# Patient Record
Sex: FEMALE | Race: WHITE | NOT HISPANIC OR LATINO | URBAN - METROPOLITAN AREA
[De-identification: names, ages, dates, MRNs, and addresses within clinical notes are randomized per-mention and may not be internally consistent; named-entity substitution may affect disease eponyms.]

---

## 2023-04-06 ENCOUNTER — HOSPITAL ENCOUNTER (OUTPATIENT)
Dept: DATA CONVERSION | Facility: HOSPITAL | Age: 18
End: 2023-04-06
Attending: INTERNAL MEDICINE | Admitting: INTERNAL MEDICINE
Payer: COMMERCIAL

## 2023-04-06 DIAGNOSIS — R19.7 DIARRHEA, UNSPECIFIED: ICD-10-CM

## 2023-04-06 DIAGNOSIS — G43.909 MIGRAINE, UNSPECIFIED, NOT INTRACTABLE, WITHOUT STATUS MIGRAINOSUS: ICD-10-CM

## 2023-04-06 DIAGNOSIS — R10.12 LEFT UPPER QUADRANT PAIN: ICD-10-CM

## 2023-04-06 DIAGNOSIS — R11.2 NAUSEA WITH VOMITING, UNSPECIFIED: ICD-10-CM

## 2023-04-06 DIAGNOSIS — R11.0 NAUSEA: ICD-10-CM

## 2023-04-06 DIAGNOSIS — F41.9 ANXIETY DISORDER, UNSPECIFIED: ICD-10-CM

## 2023-04-06 DIAGNOSIS — F32.A DEPRESSION, UNSPECIFIED: ICD-10-CM

## 2023-04-06 DIAGNOSIS — K29.50 UNSPECIFIED CHRONIC GASTRITIS WITHOUT BLEEDING: ICD-10-CM

## 2023-04-06 DIAGNOSIS — R10.9 UNSPECIFIED ABDOMINAL PAIN: ICD-10-CM

## 2023-04-06 DIAGNOSIS — Z88.0 ALLERGY STATUS TO PENICILLIN: ICD-10-CM

## 2023-04-10 LAB
COMPLETE PATHOLOGY REPORT: NORMAL
CONVERTED CLINICAL DIAGNOSIS-HISTORY: NORMAL
CONVERTED FINAL DIAGNOSIS: NORMAL
CONVERTED FINAL REPORT PDF LINK TO COPY AND PASTE: NORMAL
CONVERTED GROSS DESCRIPTION: NORMAL

## 2023-09-06 VITALS — BODY MASS INDEX: 24.21 KG/M2 | WEIGHT: 145.28 LBS | HEIGHT: 65 IN

## 2023-09-07 LAB
ALANINE AMINOTRANSFERASE (SGPT) (U/L) IN SER/PLAS: 10 U/L (ref 7–45)
ALBUMIN (G/DL) IN SER/PLAS: 4.3 G/DL (ref 3.4–5)
ALKALINE PHOSPHATASE (U/L) IN SER/PLAS: 50 U/L (ref 33–110)
ANION GAP IN SER/PLAS: 9 MMOL/L (ref 10–20)
ASPARTATE AMINOTRANSFERASE (SGOT) (U/L) IN SER/PLAS: 17 U/L (ref 9–39)
BILIRUBIN TOTAL (MG/DL) IN SER/PLAS: 0.7 MG/DL (ref 0–1.2)
CALCIUM (MG/DL) IN SER/PLAS: 9.5 MG/DL (ref 8.6–10.3)
CARBON DIOXIDE, TOTAL (MMOL/L) IN SER/PLAS: 28 MMOL/L (ref 21–32)
CHLORIDE (MMOL/L) IN SER/PLAS: 105 MMOL/L (ref 98–107)
COBALAMIN (VITAMIN B12) (PG/ML) IN SER/PLAS: 422 PG/ML (ref 211–911)
CREATININE (MG/DL) IN SER/PLAS: 0.9 MG/DL (ref 0.5–1.05)
D-DIMER, QUANTITATIVE VTE EXCLUSION: 369 NG/ML FEU
ERYTHROCYTE DISTRIBUTION WIDTH (RATIO) BY AUTOMATED COUNT: 12.4 % (ref 11.5–14.5)
ERYTHROCYTE MEAN CORPUSCULAR HEMOGLOBIN CONCENTRATION (G/DL) BY AUTOMATED: 32.3 G/DL (ref 32–36)
ERYTHROCYTE MEAN CORPUSCULAR VOLUME (FL) BY AUTOMATED COUNT: 91 FL (ref 80–100)
ERYTHROCYTES (10*6/UL) IN BLOOD BY AUTOMATED COUNT: 4.37 X10E12/L (ref 4–5.2)
FERRITIN (UG/LL) IN SER/PLAS: 15 UG/L (ref 8–150)
FOLATE (NG/ML) IN SER/PLAS: 9 NG/ML
GFR FEMALE: >90 ML/MIN/1.73M2
GLUCOSE (MG/DL) IN SER/PLAS: 68 MG/DL (ref 74–99)
HEMATOCRIT (%) IN BLOOD BY AUTOMATED COUNT: 39.9 % (ref 36–46)
HEMOGLOBIN (G/DL) IN BLOOD: 12.9 G/DL (ref 12–16)
IRON (UG/DL) IN SER/PLAS: 300 UG/DL (ref 35–150)
IRON BINDING CAPACITY (UG/DL) IN SER/PLAS: 397 UG/DL (ref 240–445)
IRON SATURATION (%) IN SER/PLAS: 76 % (ref 25–45)
LEUKOCYTES (10*3/UL) IN BLOOD BY AUTOMATED COUNT: 6 X10E9/L (ref 4.4–11.3)
MAGNESIUM (MG/DL) IN SER/PLAS: 1.92 MG/DL (ref 1.6–2.4)
NATRIURETIC PEPTIDE B (PG/ML) IN SER/PLAS: 6 PG/ML (ref 0–99)
NRBC (PER 100 WBCS) BY AUTOMATED COUNT: 0 /100 WBC (ref 0–0)
PLATELETS (10*3/UL) IN BLOOD AUTOMATED COUNT: 294 X10E9/L (ref 150–450)
POTASSIUM (MMOL/L) IN SER/PLAS: 3.8 MMOL/L (ref 3.5–5.3)
PROTEIN TOTAL: 6.8 G/DL (ref 6.4–8.2)
SODIUM (MMOL/L) IN SER/PLAS: 138 MMOL/L (ref 136–145)
THYROTROPIN (MIU/L) IN SER/PLAS BY DETECTION LIMIT <= 0.05 MIU/L: 1.62 MIU/L (ref 0.44–3.98)
UREA NITROGEN (MG/DL) IN SER/PLAS: 9 MG/DL (ref 6–23)

## 2023-09-29 PROBLEM — R42 DIZZINESS: Status: ACTIVE | Noted: 2023-09-29

## 2023-09-29 PROBLEM — E83.119 HEMOCHROMATOSIS: Status: ACTIVE | Noted: 2023-09-29

## 2023-09-29 PROBLEM — F32.A DEPRESSION: Status: ACTIVE | Noted: 2023-09-29

## 2023-09-29 PROBLEM — F41.9 ANXIETY: Status: ACTIVE | Noted: 2023-09-29

## 2023-09-29 PROBLEM — G43.909 MIGRAINE: Status: ACTIVE | Noted: 2023-09-29

## 2023-09-29 PROBLEM — K92.1 BLOOD IN STOOL: Status: ACTIVE | Noted: 2023-09-29

## 2023-09-29 PROBLEM — R00.0 TACHYCARDIA: Status: ACTIVE | Noted: 2023-09-29

## 2023-09-29 PROBLEM — S06.0X0A CONCUSSION WITH NO LOSS OF CONSCIOUSNESS: Status: ACTIVE | Noted: 2023-09-29

## 2023-09-29 PROBLEM — R06.09 DYSPNEA ON EXERTION: Status: ACTIVE | Noted: 2023-09-29

## 2023-09-29 PROBLEM — R10.9 ABDOMINAL PAIN: Status: ACTIVE | Noted: 2023-09-29

## 2023-09-29 PROBLEM — R00.2 HEART PALPITATIONS: Status: ACTIVE | Noted: 2023-09-29

## 2023-09-29 RX ORDER — METOCLOPRAMIDE 5 MG/1
5 TABLET ORAL 4 TIMES DAILY
COMMUNITY
Start: 2023-03-17 | End: 2023-11-06 | Stop reason: WASHOUT

## 2023-09-29 RX ORDER — VENLAFAXINE HYDROCHLORIDE 150 MG/1
150 CAPSULE, EXTENDED RELEASE ORAL DAILY
COMMUNITY

## 2023-09-29 RX ORDER — LANOLIN ALCOHOL/MO/W.PET/CERES
1 CREAM (GRAM) TOPICAL DAILY
COMMUNITY
End: 2023-11-06 | Stop reason: WASHOUT

## 2023-09-29 RX ORDER — FAMOTIDINE 20 MG/1
1 TABLET, FILM COATED ORAL 2 TIMES DAILY
COMMUNITY
Start: 2023-03-17 | End: 2023-11-06 | Stop reason: ALTCHOICE

## 2023-09-29 RX ORDER — POLYETHYLENE GLYCOL 3350, SODIUM SULFATE ANHYDROUS, SODIUM BICARBONATE, SODIUM CHLORIDE, POTASSIUM CHLORIDE 236; 22.74; 6.74; 5.86; 2.97 G/4L; G/4L; G/4L; G/4L; G/4L
POWDER, FOR SOLUTION ORAL
COMMUNITY
Start: 2022-12-01 | End: 2023-11-30

## 2023-09-29 RX ORDER — SUCRALFATE 1 G/1
1 TABLET ORAL DAILY
COMMUNITY

## 2023-09-29 RX ORDER — VENLAFAXINE HYDROCHLORIDE 37.5 MG/1
CAPSULE, EXTENDED RELEASE ORAL
COMMUNITY

## 2023-10-02 ENCOUNTER — HOSPITAL ENCOUNTER (OUTPATIENT)
Dept: CARDIOLOGY | Facility: CLINIC | Age: 18
Discharge: HOME | End: 2023-10-02
Payer: COMMERCIAL

## 2023-10-02 DIAGNOSIS — R00.0 TACHYCARDIA: ICD-10-CM

## 2023-10-02 LAB — EJECTION FRACTION APICAL 4 CHAMBER: 48

## 2023-10-02 PROCEDURE — 93306 TTE W/DOPPLER COMPLETE: CPT

## 2023-10-02 PROCEDURE — 93306 TTE W/DOPPLER COMPLETE: CPT | Performed by: NURSE PRACTITIONER

## 2023-10-11 ENCOUNTER — HOSPITAL ENCOUNTER (OUTPATIENT)
Dept: CARDIOLOGY | Facility: HOSPITAL | Age: 18
Discharge: HOME | End: 2023-10-11
Payer: COMMERCIAL

## 2023-10-11 DIAGNOSIS — R00.0 TACHYCARDIA: ICD-10-CM

## 2023-10-11 PROCEDURE — 93246 EXT ECG>7D<15D RECORDING: CPT

## 2023-10-30 ASSESSMENT — PATIENT HEALTH QUESTIONNAIRE - PHQ9
9. THOUGHTS THAT YOU WOULD BE BETTER OFF DEAD, OR OF HURTING YOURSELF: 0
1. LITTLE INTEREST OR PLEASURE IN DOING THINGS: SEVERAL DAYS
4. FEELING TIRED OR HAVING LITTLE ENERGY: NEARLY EVERY DAY
SUM OF ALL RESPONSES TO PHQ QUESTIONS 1-9: 10
8. MOVING OR SPEAKING SO SLOWLY THAT OTHER PEOPLE COULD HAVE NOTICED. OR THE OPPOSITE, BEING SO FIGETY OR RESTLESS THAT YOU HAVE BEEN MOVING AROUND A LOT MORE THAN USUAL: 0
8. MOVING OR SPEAKING SO SLOWLY THAT OTHER PEOPLE COULD HAVE NOTICED. OR THE OPPOSITE, BEING SO FIGETY OR RESTLESS THAT YOU HAVE BEEN MOVING AROUND A LOT MORE THAN USUAL: NOT AT ALL
7. TROUBLE CONCENTRATING ON THINGS, SUCH AS READING THE NEWSPAPER OR WATCHING TELEVISION: MORE THAN HALF THE DAYS
5. POOR APPETITE OR OVEREATING: SEVERAL DAYS
9. THOUGHTS THAT YOU WOULD BE BETTER OFF DEAD, OR OF HURTING YOURSELF: NOT AT ALL
1. LITTLE INTEREST OR PLEASURE IN DOING THINGS: SEVERAL DAYS
2. FEELING DOWN, DEPRESSED, IRRITABLE, OR HOPELESS: SEVERAL DAYS
9. THOUGHTS THAT YOU WOULD BE BETTER OFF DEAD, OR OF HURTING YOURSELF: NOT AT ALL
10. IF YOU CHECKED OFF ANY PROBLEMS, HOW DIFFICULT HAVE THESE PROBLEMS MADE IT FOR YOU TO DO YOUR WORK, TAKE CARE OF THINGS AT HOME, OR GET ALONG WITH OTHER PEOPLE: SOMEWHAT DIFFICULT
2. FEELING DOWN, DEPRESSED, IRRITABLE, OR HOPELESS: 1
7. TROUBLE CONCENTRATING ON THINGS, SUCH AS READING THE NEWSPAPER OR WATCHING TELEVISION: 2
10. IF YOU CHECKED OFF ANY PROBLEMS, HOW DIFFICULT HAVE THESE PROBLEMS MADE IT FOR YOU TO DO YOUR WORK, TAKE CARE OF THINGS AT HOME, OR GET ALONG WITH OTHER PEOPLE: SOMEWHAT DIFFICULT
8. MOVING OR SPEAKING SO SLOWLY THAT OTHER PEOPLE COULD HAVE NOTICED. OR THE OPPOSITE, BEING SO FIGETY OR RESTLESS THAT YOU HAVE BEEN MOVING AROUND A LOT MORE THAN USUAL: NOT AT ALL
3. TROUBLE FALLING OR STAYING ASLEEP OR SLEEPING TOO MUCH: SEVERAL DAYS
6. FEELING BAD ABOUT YOURSELF - OR THAT YOU ARE A FAILURE OR HAVE LET YOURSELF OR YOUR FAMILY DOWN: SEVERAL DAYS
4. FEELING TIRED OR HAVING LITTLE ENERGY: NEARLY EVERY DAY
7. TROUBLE CONCENTRATING ON THINGS, SUCH AS READING THE NEWSPAPER OR WATCHING TELEVISION: MORE THAN HALF THE DAYS
5. POOR APPETITE OR OVEREATING: 1
1. LITTLE INTEREST OR PLEASURE IN DOING THINGS: 1
6. FEELING BAD ABOUT YOURSELF - OR THAT YOU ARE A FAILURE OR HAVE LET YOURSELF OR YOUR FAMILY DOWN: SEVERAL DAYS
2. FEELING DOWN, DEPRESSED OR HOPELESS: SEVERAL DAYS
6. FEELING BAD ABOUT YOURSELF - OR THAT YOU ARE A FAILURE OR HAVE LET YOURSELF OR YOUR FAMILY DOWN: 1
3. TROUBLE FALLING OR STAYING ASLEEP OR SLEEPING TOO MUCH: 1
5. POOR APPETITE OR OVEREATING: SEVERAL DAYS
SUM OF ALL RESPONSES TO PHQ QUESTIONS 1-9: 10
3. TROUBLE FALLING OR STAYING ASLEEP OR SLEEPING TOO MUCH: SEVERAL DAYS
4. FEELING TIRED OR HAVING LITTLE ENERGY: 3

## 2023-11-03 NOTE — PROGRESS NOTES
Patient ID: Marcella Ng is a 18 y.o. female.  Referring Physician: No referring provider defined for this encounter.  Primary Care Provider: No primary care provider on file.      Subjective    HPI  Ms. Marcella Ng is a 19 y/o F presenting for initial consultation at the request of Tracy Schwab, NP, for elevated iron.     Patient had an iron saturation of 76% on 9/7/2023 with a ferritin of 15. Normal hemoglobin and hematocrit. No other lab abnormalities. TSH, folate and B12 also unremarkable. Of note, she did have an ABD on 4/6/2023 and had a biopsy of the small intestines and stomach which were unremarkable. She does have a concern for endometriosis.     Patient is not currently on iron and follows a gluten-free diet. She reports heavy periods with endometriosis along with intermittent palpitations. Not on birth control. No recurrent infections, fevers, night sweats or unexplained weight loss. Denies any lymphedema or lower extremity edema. No other complaints today.    Patient's past medical history, surgical history, family history and social history reviewed. Currently a teagan in college. Involved in track.     Objective   Vitals:    11/06/23 1258   BP: 118/85   Pulse: 96   Resp: 16   Temp: 36.4 °C (97.5 °F)   SpO2: 98%      Review of Systems:   Review of Systems:    Positive per HPI, otherwise negative.     Physical Exam:   Constitutional: Patient appears in no acute distress.   Sitting comfortably in chair.  Eyes: EOMI, clear sclera  ENMT: mucous membranes moist, no apparent injury  Head/Neck: Neck supple, no JVD  Respiratory/Thorax: Patent airways, CTAB, normal  breath sounds, no increased work of breathing  Cardiovascular: Regular, rate and rhythm, no murmurs  Gastrointestinal: Nondistended, soft, non-tender,  no rebound tenderness or guarding, no masses palpable  Extremities: normal extremities, no cyanosis edema,  no swelling  Neurological: alert and oriented x3, nonfocal, normal  speech and  hearing  Lymphatic: No palpable lymphadenopathy in cervical,  axillary  lymph nodes.  Spleen appears normal size.  Psychological: Appropriate mood and behavior, normal  affect  Skin: Warm and dry, no lesions, no rashes     Performance Status:  Symptomatic; fully ambulatory    Labs/Imaging/Pathology: personally reviewed reports and images in Epic electronic medical record system. Pertinent results as it related to the plan represented in below in assessment and plan.      Assessment/Plan    1. Elevated iron saturation     - Patient had a one-time elevation of iron saturation of 76%. Ferritin at that time was 15.   - Of note, she has heavy menstruation with endometriosis.  - We did discuss that iron saturation can be transient, however with her recent diagnosis of unexplained mitral valve regurgitation and concern for a cardiac history we will do workup for hemachromatosis.  - We will check a hemachromatosis panel along with a repeat CBC, CMP, iron and ferritin. We will call her with the results.  - We will plan for a phone visit in 1 week.  - RTC for a phone visit in 1 week.    RTC for a phone visit in 1 week. This note has been transcribed using a medical scribe and there is a possibility of unintentional typing misprints.     Diagnoses and all orders for this visit:  Iron overload, transfusional  -     Hemochromatosis Mutation; Future  -     CBC and Auto Differential; Future  -     Iron and TIBC; Future  -     Ferritin; Future    Monae Mcfarland MD  Hematology/Oncology  [facility]    Scribe Attestation  By signing my name below, Marilyn MANCERA Scribe attest that this documentation has been prepared under the direction and in the presence of Monae Mcfarland MD.

## 2023-11-06 ENCOUNTER — OFFICE VISIT (OUTPATIENT)
Dept: HEMATOLOGY/ONCOLOGY | Facility: CLINIC | Age: 18
End: 2023-11-06
Payer: COMMERCIAL

## 2023-11-06 ENCOUNTER — LAB (OUTPATIENT)
Dept: LAB | Facility: CLINIC | Age: 18
End: 2023-11-06
Payer: COMMERCIAL

## 2023-11-06 VITALS
DIASTOLIC BLOOD PRESSURE: 85 MMHG | TEMPERATURE: 97.5 F | WEIGHT: 143.74 LBS | HEIGHT: 67 IN | BODY MASS INDEX: 22.56 KG/M2 | SYSTOLIC BLOOD PRESSURE: 118 MMHG | OXYGEN SATURATION: 98 % | HEART RATE: 96 BPM | RESPIRATION RATE: 16 BRPM

## 2023-11-06 DIAGNOSIS — E83.111 IRON OVERLOAD, TRANSFUSIONAL: Primary | ICD-10-CM

## 2023-11-06 DIAGNOSIS — E83.111 IRON OVERLOAD, TRANSFUSIONAL: ICD-10-CM

## 2023-11-06 LAB
BASOPHILS # BLD AUTO: 0.02 X10*3/UL (ref 0–0.1)
BASOPHILS NFR BLD AUTO: 0.3 %
EOSINOPHIL # BLD AUTO: 0.04 X10*3/UL (ref 0–0.7)
EOSINOPHIL NFR BLD AUTO: 0.6 %
ERYTHROCYTE [DISTWIDTH] IN BLOOD BY AUTOMATED COUNT: 12.9 % (ref 11.5–14.5)
FERRITIN SERPL-MCNC: 12 NG/ML (ref 8–150)
HCT VFR BLD AUTO: 40.1 % (ref 36–46)
HGB BLD-MCNC: 13.1 G/DL (ref 12–16)
IMM GRANULOCYTES # BLD AUTO: 0 X10*3/UL (ref 0–0.7)
IMM GRANULOCYTES NFR BLD AUTO: 0 % (ref 0–0.9)
IRON SATN MFR SERPL: 13 % (ref 25–45)
IRON SERPL-MCNC: 52 UG/DL (ref 35–150)
LYMPHOCYTES # BLD AUTO: 2.12 X10*3/UL (ref 1.2–4.8)
LYMPHOCYTES NFR BLD AUTO: 29.2 %
MCH RBC QN AUTO: 30 PG (ref 26–34)
MCHC RBC AUTO-ENTMCNC: 32.7 G/DL (ref 32–36)
MCV RBC AUTO: 92 FL (ref 80–100)
MONOCYTES # BLD AUTO: 0.57 X10*3/UL (ref 0.1–1)
MONOCYTES NFR BLD AUTO: 7.9 %
NEUTROPHILS # BLD AUTO: 4.51 X10*3/UL (ref 1.2–7.7)
NEUTROPHILS NFR BLD AUTO: 62 %
PLATELET # BLD AUTO: 279 X10*3/UL (ref 150–450)
RBC # BLD AUTO: 4.37 X10*6/UL (ref 4–5.2)
TIBC SERPL-MCNC: 403 UG/DL (ref 240–445)
UIBC SERPL-MCNC: 351 UG/DL (ref 110–370)
WBC # BLD AUTO: 7.3 X10*3/UL (ref 4.4–11.3)

## 2023-11-06 PROCEDURE — 1036F TOBACCO NON-USER: CPT | Performed by: INTERNAL MEDICINE

## 2023-11-06 PROCEDURE — 83550 IRON BINDING TEST: CPT

## 2023-11-06 PROCEDURE — 36415 COLL VENOUS BLD VENIPUNCTURE: CPT

## 2023-11-06 PROCEDURE — 99203 OFFICE O/P NEW LOW 30 MIN: CPT | Performed by: INTERNAL MEDICINE

## 2023-11-06 PROCEDURE — 81256 HFE GENE: CPT

## 2023-11-06 PROCEDURE — 85025 COMPLETE CBC W/AUTO DIFF WBC: CPT

## 2023-11-06 PROCEDURE — 82728 ASSAY OF FERRITIN: CPT

## 2023-11-06 PROCEDURE — 83540 ASSAY OF IRON: CPT

## 2023-11-06 PROCEDURE — G0452 MOLECULAR PATHOLOGY INTERPR: HCPCS | Performed by: STUDENT IN AN ORGANIZED HEALTH CARE EDUCATION/TRAINING PROGRAM

## 2023-11-06 PROCEDURE — 99213 OFFICE O/P EST LOW 20 MIN: CPT | Performed by: INTERNAL MEDICINE

## 2023-11-06 ASSESSMENT — PAIN SCALES - GENERAL: PAINLEVEL: 0-NO PAIN

## 2023-11-14 LAB
ELECTRONICALLY SIGNED BY: ABNORMAL
HFE GENE MUT TESTED BLD/T: ABNORMAL
HFE P.C282Y BLD/T QL: NORMAL
HFE P.H63D BLD/T QL: ABNORMAL

## 2023-11-16 ENCOUNTER — SOCIAL WORK (OUTPATIENT)
Dept: HEMATOLOGY/ONCOLOGY | Facility: CLINIC | Age: 18
End: 2023-11-16
Payer: COMMERCIAL

## 2023-11-16 NOTE — PROGRESS NOTES
The patient reported an elevated PHQ-9 score on 10/30/2023. Chart reviewed and patient follows with outside providers for management of anxiety and depression. No additional needs identified currently.    JENN Washburn, ZEKE-S, ACHP-SW

## 2023-11-17 ENCOUNTER — OFFICE VISIT (OUTPATIENT)
Dept: HEMATOLOGY/ONCOLOGY | Facility: CLINIC | Age: 18
End: 2023-11-17
Payer: COMMERCIAL

## 2023-11-17 ENCOUNTER — TELEPHONE (OUTPATIENT)
Dept: HEMATOLOGY/ONCOLOGY | Facility: CLINIC | Age: 18
End: 2023-11-17
Payer: COMMERCIAL

## 2023-11-17 DIAGNOSIS — E83.111 IRON OVERLOAD, TRANSFUSIONAL: Primary | ICD-10-CM

## 2023-11-17 PROCEDURE — 99213 OFFICE O/P EST LOW 20 MIN: CPT | Performed by: INTERNAL MEDICINE

## 2023-11-17 PROCEDURE — 1036F TOBACCO NON-USER: CPT | Performed by: INTERNAL MEDICINE

## 2023-11-17 NOTE — TELEPHONE ENCOUNTER
VM  Patient was supposed to get a call yesterday for lab results.  Checking in to see if any updates.

## 2023-11-17 NOTE — PROGRESS NOTES
Consent:  Verbal consent was requested and obtained from patient on this date for a telehealth visit.    Patient ID: Marcella Ng is a 18 y.o. female.    Subjective    HPI  Ms. Marcella Ng is a 19 y/o F presenting for initial consultation at the request of Tracy Schwab, NP, for elevated iron.      Patient had an iron saturation of 76% on 9/7/2023 with a ferritin of 15. Normal hemoglobin and hematocrit. No other lab abnormalities. TSH, folate and B12 also unremarkable. Of note, she did have an ABD on 4/6/2023 and had a biopsy of the small intestines and stomach which were unremarkable. She does have a concern for endometriosis.      Patient is not currently on iron and follows a gluten-free diet. She reports heavy periods with endometriosis along with intermittent palpitations. Not on birth control. No recurrent infections, fevers, night sweats or unexplained weight loss. Denies any lymphedema or lower extremity edema. No other complaints today.     Interval Events:  11/17/2023: A telephone visit (audio only) between the patient at home and the provider at [facility] [place] was utilized to provide this telehealth service.     Patient presents for follow-up for elevated iron saturation. Here to review labs.     Blood work on 11/6/2023 shows a ferritin that is actually normal at 12. Iron saturation 13.     Patient's past medical history, surgical history, family history and social history reviewed.     Objective    There were no vitals taken for this visit.     Review of Systems:   Review of Systems:    Positive per HPI, otherwise negative.     Physical Exam:   Exam deferred due to telephone visit.    Performance Status:  Symptomatic; fully ambulatory    Labs/Imaging/Pathology: personally reviewed reports and images in Epic electronic medical record system. Pertinent results as it related to the plan represented in below in assessment and plan.      Assessment/Plan    1. Elevated iron saturation       - Patient had a one-time elevation of iron saturation of 76%. Ferritin at that time was 15.   - Of note, she has heavy menstruation with endometriosis.  - We did discuss that iron saturation can be transient, however with her recent diagnosis of unexplained mitral valve regurgitation and concern for a cardiac history we will do workup for hemachromatosis.  - We will check a hemachromatosis panel along with a repeat CBC, CMP, iron and ferritin. We will call her with the results.  - We will plan for a phone visit in 1 week.  - RTC for a phone visit in 1 week.    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 Repeat iron and ferritin were nomral.  - We discussed that being heterozygous for the gene suggests that she is a carrier and should not increase her risk for iron overload compared to the general population.  - We did caution against heavy EtOH use that can worsen symptoms or liver dysfunction. Otherwise, no special precautions or further workup needed from a hematologic standpoint.  - RTC as needed.    RTC as needed. This note has been transcribed using a medical scribe and there is a possibility of unintentional typing misprints.        Monae Mcfarland MD  Hematology/Oncology  [facility]    Scribe Attestation  By signing my name below, IMarilyn Scribe attest that this documentation has been prepared under the direction and in the presence of Monae Mcfarland MD.

## 2023-11-30 ENCOUNTER — OFFICE VISIT (OUTPATIENT)
Dept: CARDIOLOGY | Facility: CLINIC | Age: 18
End: 2023-11-30
Payer: COMMERCIAL

## 2023-11-30 VITALS
HEIGHT: 65 IN | HEART RATE: 120 BPM | WEIGHT: 141 LBS | DIASTOLIC BLOOD PRESSURE: 68 MMHG | BODY MASS INDEX: 23.49 KG/M2 | OXYGEN SATURATION: 98 % | SYSTOLIC BLOOD PRESSURE: 100 MMHG

## 2023-11-30 DIAGNOSIS — R00.0 TACHYCARDIA: ICD-10-CM

## 2023-11-30 DIAGNOSIS — I34.1 MVP (MITRAL VALVE PROLAPSE): Primary | ICD-10-CM

## 2023-11-30 PROCEDURE — 99214 OFFICE O/P EST MOD 30 MIN: CPT | Performed by: NURSE PRACTITIONER

## 2023-11-30 PROCEDURE — 1036F TOBACCO NON-USER: CPT | Performed by: NURSE PRACTITIONER

## 2023-11-30 NOTE — PROGRESS NOTES
"Name : Marcella Ng   : 2005   MRN : [id number]   ENC Date : 2023    CC: Follow up testing     HPI:    Miss Ng is a healthy 19 y/o  female with no significant past medical history who I evaluated in 2023 for c/o tachycardia & clearance to pole vault. Echocardiogram was done which showed EF 60% & identified moderate bileaflet MVP & mild to moderate MR. She is here today with her Mom to review echo results as well as her EM results.    Event monitor done 10/11-10/18/23 showed sinus bradycardia to sinus tachycardia with sinus arrhythmia. Avg HR 98 bpm, max  bpm, min HR 47 bpm, PAC/PVC burden <1%, No evidence of SVT or any other sustained arrhythmias. 17 patient triggered events that correlated primarily with Sinus Tachycardia.    ROS: unless otherwise noted in the history of present illness, all other systems were reviewed and they are negative for complaints     Allergies:  Amoxicillin    Current Outpatient Medications   Medication Instructions    sucralfate (CARAFATE) 1 g, oral, Daily    venlafaxine XR (Effexor-XR) 37.5 mg 24 hr capsule  TAKE 1 CAPSULE BY MOUTH EVERY DAY WITH 150 MG CAPSULE. With food    venlafaxine XR (EFFEXOR-XR) 150 mg, oral, Daily, With food        Last Labs:  CBC  Lab Results   Component Value Date    WBC 7.3 2023    HGB 13.1 2023    HCT 40.1 2023    MCV 92 2023     2023       CMP  Lab Results   Component Value Date    CALCIUM 9.5 2023    PROT 6.8 2023    ALBUMIN 4.3 2023    AST 17 2023    ALT 10 2023    ALKPHOS 50 2023    BILITOT 0.7 2023       BMP   Lab Results   Component Value Date     2023    K 3.8 2023     2023    CO2 28 2023    GLUCOSE 68 (L) 2023    BUN 9 2023    CREATININE 0.90 2023       LIPID PANEL   No results found for: \"CHOL\", \"TRIG\", \"HDL\", \"CHHDL\", \"LDLF\", \"VLDL\", \"NHDL\"    RENAL FUNCTION PANEL   Lab " "Results   Component Value Date    GLUCOSE 68 (L) 09/07/2023     09/07/2023    K 3.8 09/07/2023     09/07/2023    CO2 28 09/07/2023    ANIONGAP 9 (L) 09/07/2023    BUN 9 09/07/2023    CREATININE 0.90 09/07/2023    CALCIUM 9.5 09/07/2023    ALBUMIN 4.3 09/07/2023        Lab Results   Component Value Date    BNP 6 09/07/2023     Last Recorded Vitals:  Vitals:    11/30/23 1054   BP: 100/68   BP Location: Left arm   Patient Position: Sitting   Pulse: (!) 120   SpO2: 98%   Weight: 64 kg (141 lb)   Height: 1.651 m (5' 5\")       Physical Exam:  On exam Ms. Ng appears her stated age, is alert and oriented x3, and in no acute distress. Her sclera are anicteric and her oropharynx has moist mucous membranes. Her neck is supple and without thyromegaly. The JVP is ~5 cm of water above the right atrium. Her cardiac exam has regular rhythm, normal S1, S2. No S3/4. There are no murmurs. Her lungs are clear to auscultation bilaterally and there is no dullness to percussion. Her abdomen is soft, nontender with normoactive bowel sounds. There is no HJR. The extremities are warm and without edema. The skin is dry. There is no rash present. The distal pulses are 2-3+ in all four extremities. Her mood and affect are appropriate for todays encounter.     Last Cardiology Tests:  Echo 10/2/23:   1. Left ventricular systolic function is normal with a 60% estimated ejection fraction.   2. The mitral valve is mildly thickened. 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   3. There is moderate mitral valve prolapse of the anterior and posterior leaflets.   4. Barlows syndrome is present.   5. Mild to moderate mitral valve regurgitation.   6. RVSP within normal limits.    Event monitor as above    Assessment/Plan:  Sinus Tachycardia  Mitral Valve Prolapse    Given the results, I do not feel strongly about initiating BB or CCB at this time as she is 19 y/o & side effects of the medications may outweigh benefit at this time. Would " "recommend minimally annual follow up with repeat echocardiogram ~2-3yrs. Personally, I would like to see her back in 6 months. Case & results reviewed with Dr. Mendez who agrees with above plan.     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 Did discuss initiation of medication, though I am not in favor, of which one of the many side effects is fatigue, but Miss Ng was opposed. Miss Ng excused herself from the appointment and stated that she did not plan to follow up.     I again advised minimally 1 year follow up for monitoring, offered appointment with Dr. Mendez to which she declined. Even told her to establish with another cardiology provider if she felt more comfortable, but she should be monitored. Her Mother explained that she \"is stubborn & will not follow up. What signs or symptoms should I watch out for\". Signs symptoms of concern would be that of syncope or heart failure (SOB, edema, etc).    Tracy M Schwab, APRN-CNP    "

## 2024-09-19 ENCOUNTER — CLINICAL SUPPORT (OUTPATIENT)
Dept: EMERGENCY MEDICINE | Facility: HOSPITAL | Age: 19
End: 2024-09-19
Payer: COMMERCIAL

## 2024-09-19 ENCOUNTER — HOSPITAL ENCOUNTER (EMERGENCY)
Facility: HOSPITAL | Age: 19
Discharge: HOME | End: 2024-09-19
Attending: STUDENT IN AN ORGANIZED HEALTH CARE EDUCATION/TRAINING PROGRAM
Payer: COMMERCIAL

## 2024-09-19 ENCOUNTER — APPOINTMENT (OUTPATIENT)
Dept: RADIOLOGY | Facility: HOSPITAL | Age: 19
End: 2024-09-19
Payer: COMMERCIAL

## 2024-09-19 VITALS
TEMPERATURE: 98.2 F | RESPIRATION RATE: 16 BRPM | BODY MASS INDEX: 23.32 KG/M2 | OXYGEN SATURATION: 100 % | SYSTOLIC BLOOD PRESSURE: 105 MMHG | WEIGHT: 140 LBS | HEART RATE: 73 BPM | DIASTOLIC BLOOD PRESSURE: 69 MMHG | HEIGHT: 65 IN

## 2024-09-19 DIAGNOSIS — R07.9 CHEST PAIN, UNSPECIFIED TYPE: Primary | ICD-10-CM

## 2024-09-19 LAB
ALBUMIN SERPL BCP-MCNC: 4.8 G/DL (ref 3.4–5)
ALP SERPL-CCNC: 59 U/L (ref 33–110)
ALT SERPL W P-5'-P-CCNC: 6 U/L (ref 7–45)
ANION GAP SERPL CALC-SCNC: 10 MMOL/L (ref 10–20)
AST SERPL W P-5'-P-CCNC: 24 U/L (ref 9–39)
ATRIAL RATE: 87 BPM
BASOPHILS # BLD AUTO: 0.03 X10*3/UL (ref 0–0.1)
BASOPHILS NFR BLD AUTO: 0.5 %
BILIRUB SERPL-MCNC: 0.5 MG/DL (ref 0–1.2)
BUN SERPL-MCNC: 8 MG/DL (ref 6–23)
CALCIUM SERPL-MCNC: 9.8 MG/DL (ref 8.6–10.6)
CARDIAC TROPONIN I PNL SERPL HS: <3 NG/L (ref 0–34)
CARDIAC TROPONIN I PNL SERPL HS: <3 NG/L (ref 0–34)
CHLORIDE SERPL-SCNC: 103 MMOL/L (ref 98–107)
CO2 SERPL-SCNC: 29 MMOL/L (ref 21–32)
CREAT SERPL-MCNC: 0.87 MG/DL (ref 0.5–1.05)
EGFRCR SERPLBLD CKD-EPI 2021: >90 ML/MIN/1.73M*2
EOSINOPHIL # BLD AUTO: 0.07 X10*3/UL (ref 0–0.7)
EOSINOPHIL NFR BLD AUTO: 1.2 %
ERYTHROCYTE [DISTWIDTH] IN BLOOD BY AUTOMATED COUNT: 11.7 % (ref 11.5–14.5)
GLUCOSE SERPL-MCNC: 81 MG/DL (ref 74–99)
HCT VFR BLD AUTO: 44.7 % (ref 36–46)
HGB BLD-MCNC: 14.7 G/DL (ref 12–16)
IMM GRANULOCYTES # BLD AUTO: 0.01 X10*3/UL (ref 0–0.7)
IMM GRANULOCYTES NFR BLD AUTO: 0.2 % (ref 0–0.9)
LYMPHOCYTES # BLD AUTO: 1.98 X10*3/UL (ref 1.2–4.8)
LYMPHOCYTES NFR BLD AUTO: 34 %
MCH RBC QN AUTO: 30.2 PG (ref 26–34)
MCHC RBC AUTO-ENTMCNC: 32.9 G/DL (ref 32–36)
MCV RBC AUTO: 92 FL (ref 80–100)
MONOCYTES # BLD AUTO: 0.36 X10*3/UL (ref 0.1–1)
MONOCYTES NFR BLD AUTO: 6.2 %
NEUTROPHILS # BLD AUTO: 3.38 X10*3/UL (ref 1.2–7.7)
NEUTROPHILS NFR BLD AUTO: 57.9 %
NRBC BLD-RTO: 0 /100 WBCS (ref 0–0)
P AXIS: 72 DEGREES
P OFFSET: 199 MS
P ONSET: 156 MS
PLATELET # BLD AUTO: 310 X10*3/UL (ref 150–450)
POTASSIUM SERPL-SCNC: 4 MMOL/L (ref 3.5–5.3)
PR INTERVAL: 126 MS
PROT SERPL-MCNC: 8.1 G/DL (ref 6.4–8.2)
Q ONSET: 219 MS
QRS COUNT: 14 BEATS
QRS DURATION: 72 MS
QT INTERVAL: 346 MS
QTC CALCULATION(BAZETT): 416 MS
QTC FREDERICIA: 391 MS
R AXIS: 50 DEGREES
RBC # BLD AUTO: 4.86 X10*6/UL (ref 4–5.2)
SODIUM SERPL-SCNC: 138 MMOL/L (ref 136–145)
T AXIS: 38 DEGREES
T OFFSET: 392 MS
VENTRICULAR RATE: 87 BPM
WBC # BLD AUTO: 5.8 X10*3/UL (ref 4.4–11.3)

## 2024-09-19 PROCEDURE — 36415 COLL VENOUS BLD VENIPUNCTURE: CPT | Performed by: STUDENT IN AN ORGANIZED HEALTH CARE EDUCATION/TRAINING PROGRAM

## 2024-09-19 PROCEDURE — 85025 COMPLETE CBC W/AUTO DIFF WBC: CPT | Performed by: STUDENT IN AN ORGANIZED HEALTH CARE EDUCATION/TRAINING PROGRAM

## 2024-09-19 PROCEDURE — 93005 ELECTROCARDIOGRAM TRACING: CPT

## 2024-09-19 PROCEDURE — 93010 ELECTROCARDIOGRAM REPORT: CPT | Performed by: STUDENT IN AN ORGANIZED HEALTH CARE EDUCATION/TRAINING PROGRAM

## 2024-09-19 PROCEDURE — 84484 ASSAY OF TROPONIN QUANT: CPT | Performed by: STUDENT IN AN ORGANIZED HEALTH CARE EDUCATION/TRAINING PROGRAM

## 2024-09-19 PROCEDURE — 99285 EMERGENCY DEPT VISIT HI MDM: CPT | Performed by: STUDENT IN AN ORGANIZED HEALTH CARE EDUCATION/TRAINING PROGRAM

## 2024-09-19 PROCEDURE — 99283 EMERGENCY DEPT VISIT LOW MDM: CPT | Mod: 25

## 2024-09-19 PROCEDURE — 71046 X-RAY EXAM CHEST 2 VIEWS: CPT

## 2024-09-19 PROCEDURE — 80053 COMPREHEN METABOLIC PANEL: CPT | Performed by: STUDENT IN AN ORGANIZED HEALTH CARE EDUCATION/TRAINING PROGRAM

## 2024-09-19 PROCEDURE — 71046 X-RAY EXAM CHEST 2 VIEWS: CPT | Performed by: STUDENT IN AN ORGANIZED HEALTH CARE EDUCATION/TRAINING PROGRAM

## 2024-09-19 PROCEDURE — 2500000001 HC RX 250 WO HCPCS SELF ADMINISTERED DRUGS (ALT 637 FOR MEDICARE OP): Performed by: STUDENT IN AN ORGANIZED HEALTH CARE EDUCATION/TRAINING PROGRAM

## 2024-09-19 RX ORDER — IBUPROFEN 400 MG/1
400 TABLET ORAL ONCE
Status: COMPLETED | OUTPATIENT
Start: 2024-09-19 | End: 2024-09-19

## 2024-09-19 RX ORDER — ACETAMINOPHEN 325 MG/1
975 TABLET ORAL ONCE
Status: COMPLETED | OUTPATIENT
Start: 2024-09-19 | End: 2024-09-19

## 2024-09-19 RX ADMIN — ACETAMINOPHEN 975 MG: 325 TABLET ORAL at 19:02

## 2024-09-19 RX ADMIN — IBUPROFEN 400 MG: 400 TABLET ORAL at 19:02

## 2024-09-19 ASSESSMENT — PAIN SCALES - GENERAL
PAINLEVEL_OUTOF10: 5 - MODERATE PAIN
PAINLEVEL_OUTOF10: 0 - NO PAIN
PAINLEVEL_OUTOF10: 4

## 2024-09-19 ASSESSMENT — COLUMBIA-SUICIDE SEVERITY RATING SCALE - C-SSRS
2. HAVE YOU ACTUALLY HAD ANY THOUGHTS OF KILLING YOURSELF?: NO
1. IN THE PAST MONTH, HAVE YOU WISHED YOU WERE DEAD OR WISHED YOU COULD GO TO SLEEP AND NOT WAKE UP?: NO
6. HAVE YOU EVER DONE ANYTHING, STARTED TO DO ANYTHING, OR PREPARED TO DO ANYTHING TO END YOUR LIFE?: NO

## 2024-09-19 ASSESSMENT — LIFESTYLE VARIABLES
TOTAL SCORE: 0
HAVE YOU EVER FELT YOU SHOULD CUT DOWN ON YOUR DRINKING: NO
EVER HAD A DRINK FIRST THING IN THE MORNING TO STEADY YOUR NERVES TO GET RID OF A HANGOVER: NO
HAVE PEOPLE ANNOYED YOU BY CRITICIZING YOUR DRINKING: NO
EVER FELT BAD OR GUILTY ABOUT YOUR DRINKING: NO

## 2024-09-19 ASSESSMENT — PAIN - FUNCTIONAL ASSESSMENT: PAIN_FUNCTIONAL_ASSESSMENT: 0-10

## 2024-09-19 NOTE — ED TRIAGE NOTES
Presents with c/o CP that started yesterday  morning. Endorsing SOB. States the pain ahs been constant. H/O mitral valve prolapse. Follows with cariology at [facility]

## 2024-09-19 NOTE — ED PROVIDER NOTES
"Emergency Department Provider Note        History of Present Illness     History provided by: Patient  Limitations to History: None  External Records Reviewed with Brief Summary:  Outpatient cardiology notes and echo.  Patient has mitral valve prolapse, moderate mitral regurgitation.  Preserved ejection fraction of approximately 60%    HPI:  Marcella Ng is a 19 y.o. female presenting to the emergency department for 30-hour history of chest pain.  She states that she has had on and off chest pain before however it is never lasted this long.  Past medical history significant for mitral valve prolapse, she takes metoprolol and venlafaxine.  She has history of generalized anxiety disorder and OCD.  Patient also notes intermittent heart palpitations that feel like a \"swallowing\".  She does not know how else to describe it.  Patient is a poor vaulter at [employer], she discussed this with her  who recommended coming to the emergency department for evaluation.  She has not practiced since this started.  Of note, patient has a sports cardiologist at [facility] that she follows with.  Last seen in February, she supposed see him every 6 months and was going to see him in August however he is currently on medical leave.  She is compliant with her medications, she states walking can make the pain worse, eating and drinking do not appear to change it at all.  Patient denies any hormonal contraceptive use, no family history of cancer, she does not smoke, no nausea or vomiting.  She denies any infectious symptoms such as fever, cough, congestion, chills.    Physical Exam   Triage vitals:  T 36.5 °C (97.7 °F)  HR 89  /85  RR 16  O2 98 %      Physical Exam  Constitutional:       General: She is not in acute distress.     Appearance: She is well-developed. She is not ill-appearing.   Cardiovascular:      Comments: Normal rate and rhythm, no extra heart sounds appreciated.  No murmur noted, had " patient perform handgrip and Valsalva without any murmur appreciated.  Pulmonary:      Effort: Pulmonary effort is normal. No accessory muscle usage.      Breath sounds: Normal breath sounds.   Skin:     Capillary Refill: Capillary refill takes less than 2 seconds.   Neurological:      Mental Status: She is alert and oriented to person, place, and time.   Psychiatric:         Mood and Affect: Mood normal.         Behavior: Behavior normal.          Medical Decision Making & ED Course   Medical Decision Makin y.o. female presenting to the emergency department for concerns of chest pain and palpitations.  As per HPI, patient has history of mitral valve prolapse.  Follows with cardiology.  She is a  but has been holding off from college athletics all this is occurring.  She has been taking her medicines as prescribed.  Differential is broad and includes arrhythmia, progression of valvular disorder.  Anxiety.  Physical exam is reassuring with no concerning murmur or abnormal vital signs at this time.  We will pursue traditional cardiac workup.  In addition, patient does not smoke and does not use oral contraceptives or any form of hormonal birth control.  PE was considered however patient had a PERC score of 0, so she was PERC negative and as a result no D-dimer or CT PE was ordered.  Results came back remarkable for troponin less than 3, unremarkable CBC and CMP.  Chest x-ray had no acute pathology which I personally reviewed and saw no signs of pneumothorax, cardiomegaly, pneumonia.  All of this was discussed with patient and mother.  Encouraged calling their cardiology clinic, and seeing if she could see an alternative provider in the meantime.  Until her primary doctor is back from medical leave.  Strict return precautions given, patient discharged home in stable condition.  --    EKG Independent Interpretation: Patient's EKG obtained at 7:25 PM showed regular sinus rhythm, no notable axis  deviation.  No signs of ST elevation or depression, no concerning findings of T waves.  P waves were present throughout.      Chronic conditions affecting the patient's care: As documented above in [facility]      Care Considerations: As documented above in [facility]    ED Course:  ED Course as of 09/19/24 2052   Thu Sep 19, 2024   2021 Attending summary:   [SS]      ED Course User Index  [SS] Yaima Shabazz MD         Diagnoses as of 09/19/24 2052   Chest pain, unspecified type     Disposition   As a result of the work-up, the patient was discharged home.  she was informed of her diagnosis and instructed to come back with any concerns or worsening of condition.  she and was agreeable to the plan as discussed above.  she was given the opportunity to ask questions.  All of the patient's questions were answered.    Procedures   Procedures    rKystian Cavanaugh DO  Emergency Medicine      Krystian Cavanaugh DO  Resident  09/19/24 2054

## 2024-09-20 NOTE — DISCHARGE INSTRUCTIONS
Discharge home with reassurance, follow-up with cardiology.  If unable to see primary cardiologist, please ask the clinic if able to see a different cardiologist.  If you develop worsening symptoms or any other concerning findings.  Please either reach out to your cardiologist, or return to the emergency department for evaluation.